# Patient Record
Sex: MALE | Race: WHITE | NOT HISPANIC OR LATINO | Employment: FULL TIME | URBAN - METROPOLITAN AREA
[De-identification: names, ages, dates, MRNs, and addresses within clinical notes are randomized per-mention and may not be internally consistent; named-entity substitution may affect disease eponyms.]

---

## 2018-04-24 ENCOUNTER — OFFICE VISIT (OUTPATIENT)
Dept: NEUROLOGY | Facility: CLINIC | Age: 33
End: 2018-04-24
Attending: PSYCHIATRY & NEUROLOGY
Payer: COMMERCIAL

## 2018-04-24 VITALS
SYSTOLIC BLOOD PRESSURE: 119 MMHG | WEIGHT: 253.5 LBS | BODY MASS INDEX: 32.53 KG/M2 | OXYGEN SATURATION: 97 % | RESPIRATION RATE: 24 BRPM | HEIGHT: 74 IN | DIASTOLIC BLOOD PRESSURE: 79 MMHG | TEMPERATURE: 98 F | HEART RATE: 73 BPM

## 2018-04-24 DIAGNOSIS — G37.9 DEMYELINATING DISEASE OF CENTRAL NERVOUS SYSTEM (H): Primary | ICD-10-CM

## 2018-04-24 PROCEDURE — G0463 HOSPITAL OUTPT CLINIC VISIT: HCPCS | Mod: ZF

## 2018-04-24 ASSESSMENT — PAIN SCALES - GENERAL: PAINLEVEL: SEVERE PAIN (6)

## 2018-04-24 NOTE — MR AVS SNAPSHOT
"              After Visit Summary   2018    Chu Paris    MRN: [id number]           Patient Information     Date Of Birth          1985        Visit Information        Provider Department      2018 10:30 AM Murphy Pride MD [facility] Multiple Sclerosis        Today's Diagnoses     Demyelinating disease of central nervous system (H)    -  1       Follow-ups after your visit        Follow-up notes from your care team     Return if symptoms worsen or fail to improve.      Who to contact     If you have questions or need follow up information about today's clinic visit or your schedule please contact [facility] MULTIPLE SCLEROSIS directly at [phone].  Normal or non-critical lab and imaging results will be communicated to you by [employer], letter or phone within 4 business days after the clinic has received the results. If you do not hear from us within 7 days, please contact the clinic through [employer] or phone. If you have a critical or abnormal lab result, we will notify you by phone as soon as possible.  Submit refill requests through [facility] or call your pharmacy and they will forward the refill request to us. Please allow 3 business days for your refill to be completed.          Additional Information About Your Visit        MyChart Information     [facility] lets you send messages to your doctor, view your test results, renew your prescriptions, schedule appointments and more. To sign up, go to [email or web address] . Click on \"Log in\" on the left side of the screen, which will take you to the Welcome page. Then click on \"Sign up Now\" on the right side of the page.     You will be asked to enter the access code listed below, as well as some personal information. Please follow the directions to create your username and password.     Your access code is: [email or web address]  Expires: 2018  6:31 AM     Your access code will  in 90 days. If you need help or a new code, please call your " "[facility] or [phone].        Care EveryWhere ID     This is your Care EveryWhere ID. This could be used by other organizations to access your [facility] medical records  [id number]        Your Vitals Were     Pulse Temperature Respirations Height Pulse Oximetry BMI (Body Mass Index)    73 98  F (36.7  C) (Oral) 24 1.88 m (6' 2\") 97% 32.55 kg/m2       Blood Pressure from Last 3 Encounters:   04/24/18 119/79   11/01/16 115/68   11/03/15 123/80    Weight from Last 3 Encounters:   04/24/18 115 kg (253 lb 8 oz)   11/01/16 116.4 kg (256 lb 11.2 oz)   09/23/14 110.2 kg (243 lb)              Today, you had the following     No orders found for display      Information about OPIOIDS     PRESCRIPTION OPIOIDS: WHAT YOU NEED TO KNOW   You have a prescription for an opioid (narcotic) pain medicine. Opioids can cause addiction. If you have a history of chemical dependency of any type, you are at a higher risk of becoming addicted to opioids. Only take this medicine after all other options have been tried. Take it for as short a time and as few doses as possible.     Do not:    Drive. If you drive while taking these medicines, you could be arrested for driving under the influence (DUI).    Operate heavy machinery    Do any other dangerous activities while taking these medicines.     Drink any alcohol while taking these medicines.      Take with any other medicines that contain acetaminophen. Read all labels carefully. Look for the word  acetaminophen  or  Tylenol.  Ask your pharmacist if you have questions or are unsure.    Store your pills in a secure place, locked if possible. We will not replace any lost or stolen medicine. If you don t finish your medicine, please throw away (dispose) as directed by your pharmacist. The [place] [employer]ution [employer] has more information about safe disposal: https://www.pca.state.[place]/living-green/managing-unwanted-medications    All opioids tend to cause constipation. Drink " plenty of water and eat foods that have a lot of fiber, such as fruits, vegetables, prune juice, apple juice and high-fiber cereal. Take a laxative (Miralax, milk of magnesia, Colace, Senna) if you don t move your bowels at least every other day.          Primary Care Provider Office Phone # Fax #    Stanislav Call [phone]       [facility] [address] [place] [phone]        Equal Access to Services     SURESH AVELAR : Hadii nat ku hadasho Soomaali, waaxda luqadaha, qaybta kaalmada adeegyada, waxay yvonne carrizales. So [facility] [phone].    ATENCIÓN: Si habla español, tiene a white disposición servicios gratuitos de asistencia lingüística. Llame al [phone].    We comply with applicable federal civil rights laws and [place] laws. We do not discriminate on the basis of race, color, national origin, age, disability, sex, sexual orientation, or gender identity.            Thank you!     Thank you for choosing [facility] MULTIPLE SCLEROSIS  for your care. Our goal is always to provide you with excellent care. Hearing back from our patients is one way we can continue to improve our services. Please take a few minutes to complete the written survey that you may receive in the mail after your visit with us. Thank you!             Your Updated Medication List - Protect others around you: Learn how to safely use, store and throw away your medicines at www.disposemymeds.org.          This list is accurate as of 4/24/18 11:59 PM.  Always use your most recent med list.                   Brand Name Dispense Instructions for use Diagnosis    DAILY MULTIVITAMIN PO      Take 1 tablet by mouth daily        TRAMADOL HCL PO      Take 50 mg by mouth every 6 hours as needed for moderate to severe pain        VITAMIN D (CHOLECALCIFEROL) PO      Take 5,000 Units by mouth daily

## 2018-04-24 NOTE — NURSING NOTE
"Chief Complaint   Patient presents with     RECHECK     [facility]- MULTIPLE SCLEROSIS, 1 YEAR F/U       Initial /79  Pulse 73  Temp 98  F (36.7  C) (Oral)  Resp 24  Ht 1.88 m (6' 2\")  Wt 115 kg (253 lb 8 oz)  SpO2 97%  BMI 32.55 kg/m2 Estimated body mass index is 32.55 kg/(m^2) as calculated from the following:    Height as of this encounter: 1.88 m (6' 2\").    Weight as of this encounter: 115 kg (253 lb 8 oz).  Medication Reconciliation: complete     Marvin Gustafson, CMA      "

## 2018-04-24 NOTE — LETTER
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  He had an attack of partial transverse myelitis in 2013, from which he completely recovered.  Spinal fluid analysis showed no evidence of intrathecal IgG synthesis.  Brain MRI shows a few tiny scattered non-specific lesions.    Mr. Paris does not meet diagnostic criteria for multiple sclerosis (MS).  We have been following him with clinical and MRI surveillance to see if he develops that disorder, which to date he has not.        Sincerely,             Murphy Pride MD

## 2018-04-24 NOTE — LETTER
"  4/24/2018      RE: Chu Paris  [address] [address]       Service Date: 04/24/2018      REASON FOR VISIT:  Chu Paris is a 32-year-old man who I follow for clinically isolated syndrome/possible multiple sclerosis.  He returns for routine followup today.  I last saw him in 11/2016.      HISTORY OF PRESENT ILLNESS:  Jonah has been doing fine and has no residual neurologic symptoms from his attack of partial transverse myelitis in 2013.  He is lifting weights regularly and states he is in the \"best shape of my life.\"  He remains on no immunotherapy.  He questions whether he has multiple sclerosis, and we discussed that in detail today.  It is certainly quite possible that he does not, and the transverse myelitis was a one-time event.  He asks for a letter clarifying this and I wrote that for him today.  He had previously been felt to meet diagnostic criteria for MS based on MRI findings, but that really hinged on the presence of a very questionable enhancing lesion on a scan from 2013.  We reviewed that again today.  There is one T2 hyperintense lesion on that scan that may be faintly enhancing on 1 post-contrast view, but is not visible on the other.  Overall, I really do not think this represents sufficient evidence to meet diagnostic criteria for MS, especially given his lack of lesion accumulation and normal spinal fluid.  He has not had any new non-neurologic medical problems since I saw him last.      PHYSICAL EXAMINATION:   VITAL SIGNS:  Blood pressure 119/79.  Pulse 73.  Weight 253 pounds.     GENERAL:   He is alert and oriented.  Affect is bright and language functions are normal.     NEUROLOGIC:   Cranial nerves are unremarkable.  He is a large and very muscular man.  Muscle bulk, tone and strength are normal in all 4 limbs.  Coordination testing is normal to finger tapping, toe tapping and finger-nose-finger.  Light touch is intact in the hands and feet.  Deep tendon reflexes are " normal and symmetric and his gait is normal.      IMPRESSION:  Clinically isolated syndrome, clinically stable on no immunotherapy.        I spent 25 minutes with Jonah today, greater than 50% of which was spent in counseling and coordination of care.  We had planned to repeat a brain MRI this fall.  I had instructed him in late  that we would repeat an MRI in about 2 years, and if he was feeling fine he could wait until then to come back and see me.  He had misinterpreted this as that he should come back now, but the MRI at this time was optional.  He is basically 5 years out from his clinically isolated syndrome.  I told him that I think monitoring for new lesions is imperative for at least 5 years after an initial demyelinating event, and continuing it for up to 10 years is reasonable as well.  He would like to hold off on scheduling a repeat MRI due to significant out-of-pocket costs.  I told him that doing it any time in the next 1-2 years is fine, and he will call when it is convenient to get that set up.        PLAN:      1.  He will call sometime in the next 1-2 years to schedule a repeat MRI scan.     2.  Followup p.r.n.      MD MURPHY Dobson MD             D: 2018   T: 2018   MT: FILI      Name:     MARTHA GUSMAN   MRN:      [id number]        Account:      [id number]   :      1985           Service Date: 2018      Document: [id number]        Murphy Pride MD

## 2018-04-24 NOTE — LETTER
"4/24/2018      RE: Chu Paris  [address] [facility] [address]       Service Date: 04/24/2018      REASON FOR VISIT:  Chu Paris is a 32-year-old man who I follow for clinically isolated syndrome/possible multiple sclerosis.  He returns for routine followup today.  I last saw him in 11/2016.      HISTORY OF PRESENT ILLNESS:  Jonah has been doing fine and has no residual neurologic symptoms from his attack of partial transverse myelitis in 2013.  He is lifting weights regularly and states he is in the \"best shape of my life.\"  He remains on no immunotherapy.  He questions whether he has multiple sclerosis, and we discussed that in detail today.  It is certainly quite possible that he does not, and the transverse myelitis was a one-time event.  He asks for a letter clarifying this and I wrote that for him today.  He had previously been felt to meet diagnostic criteria for MS based on MRI findings, but that really hinged on the presence of a very questionable enhancing lesion on a scan from 2013.  We reviewed that again today.  There is one T2 hyperintense lesion on that scan that may be faintly enhancing on 1 post-contrast view, but is not visible on the other.  Overall, I really do not think this represents sufficient evidence to meet diagnostic criteria for MS, especially given his lack of lesion accumulation and normal spinal fluid.  He has not had any new non-neurologic medical problems since I saw him last.      PHYSICAL EXAMINATION:   VITAL SIGNS:  Blood pressure 119/79.  Pulse 73.  Weight 253 pounds.     GENERAL:   He is alert and oriented.  Affect is bright and language functions are normal.     NEUROLOGIC:   Cranial nerves are unremarkable.  He is a large and very muscular man.  Muscle bulk, tone and strength are normal in all 4 limbs.  Coordination testing is normal to finger tapping, toe tapping and finger-nose-finger.  Light touch is intact in the hands and feet.  Deep tendon reflexes are " normal and symmetric and his gait is normal.      IMPRESSION:  Clinically isolated syndrome, clinically stable on no immunotherapy.        I spent 25 minutes with Jonah today, greater than 50% of which was spent in counseling and coordination of care.  We had planned to repeat a brain MRI this fall.  I had instructed him in late  that we would repeat an MRI in about 2 years, and if he was feeling fine he could wait until then to come back and see me.  He had misinterpreted this as that he should come back now, but the MRI at this time was optional.  He is basically 5 years out from his clinically isolated syndrome.  I told him that I think monitoring for new lesions is imperative for at least 5 years after an initial demyelinating event, and continuing it for up to 10 years is reasonable as well.  He would like to hold off on scheduling a repeat MRI due to significant out-of-pocket costs.  I told him that doing it any time in the next 1-2 years is fine, and he will call when it is convenient to get that set up.        PLAN:      1.  He will call sometime in the next 1-2 years to schedule a repeat MRI scan.     2.  Followup p.r.n.         D: 2018   T: 2018   MT: FILI      Name:     MARTHA GUSMAN   MRN:      [id number]        Account:      [id number]   :      1985           Service Date: 2018      Document: [id number]        Murphy Pride MD

## 2018-04-26 NOTE — PROGRESS NOTES
"Service Date: 04/24/2018      REASON FOR VISIT:  Chu Paris is a 32-year-old man who I follow for clinically isolated syndrome/possible multiple sclerosis.  He returns for routine followup today.  I last saw him in 11/2016.      HISTORY OF PRESENT ILLNESS:  Jonah has been doing fine and has no residual neurologic symptoms from his attack of partial transverse myelitis in 2013.  He is lifting weights regularly and states he is in the \"best shape of my life.\"  He remains on no immunotherapy.  He questions whether he has multiple sclerosis, and we discussed that in detail today.  It is certainly quite possible that he does not, and the transverse myelitis was a one-time event.  He asks for a letter clarifying this and I wrote that for him today.  He had previously been felt to meet diagnostic criteria for MS based on MRI findings, but that really hinged on the presence of a very questionable enhancing lesion on a scan from 2013.  We reviewed that again today.  There is one T2 hyperintense lesion on that scan that may be faintly enhancing on 1 post-contrast view, but is not visible on the other.  Overall, I really do not think this represents sufficient evidence to meet diagnostic criteria for MS, especially given his lack of lesion accumulation and normal spinal fluid.  He has not had any new non-neurologic medical problems since I saw him last.      PHYSICAL EXAMINATION:   VITAL SIGNS:  Blood pressure 119/79.  Pulse 73.  Weight 253 pounds.     GENERAL:   He is alert and oriented.  Affect is bright and language functions are normal.     NEUROLOGIC:   Cranial nerves are unremarkable.  He is a large and very muscular man.  Muscle bulk, tone and strength are normal in all 4 limbs.  Coordination testing is normal to finger tapping, toe tapping and finger-nose-finger.  Light touch is intact in the hands and feet.  Deep tendon reflexes are normal and symmetric and his gait is normal.      IMPRESSION:  Clinically isolated " syndrome, clinically stable on no immunotherapy.        I spent 25 minutes with Jonah today, greater than 50% of which was spent in counseling and coordination of care.  We had planned to repeat a brain MRI this fall.  I had instructed him in late  that we would repeat an MRI in about 2 years, and if he was feeling fine he could wait until then to come back and see me.  He had misinterpreted this as that he should come back now, but the MRI at this time was optional.  He is basically 5 years out from his clinically isolated syndrome.  I told him that I think monitoring for new lesions is imperative for at least 5 years after an initial demyelinating event, and continuing it for up to 10 years is reasonable as well.  He would like to hold off on scheduling a repeat MRI due to significant out-of-pocket costs.  I told him that doing it any time in the next 1-2 years is fine, and he will call when it is convenient to get that set up.        PLAN:      1.  He will call sometime in the next 1-2 years to schedule a repeat MRI scan.     2.  Followup p.r.n.      MD AUGIE Dobson MD             D: 2018   T: 2018   MT: FILI      Name:     MARTHA GUSMAN   MRN:      [id number]        Account:      [id number]   :      1985           Service Date: 2018      Document: [id number]

## 2018-06-26 ENCOUNTER — TELEPHONE (OUTPATIENT)
Dept: NEUROLOGY | Facility: CLINIC | Age: 33
End: 2018-06-26

## 2018-06-26 NOTE — TELEPHONE ENCOUNTER
I left VMM at number provided and left VMM with our medical records department information.    Divina Ramirez, MS RN Care Coordinator

## 2018-06-26 NOTE — TELEPHONE ENCOUNTER
[facility] Call Center    Phone Message    May a detailed message be left on voicemail: yes    Reason for Call: Other: Barbara called in regards to forms she sent for Medical Records Please call back to discuss status Phone: [phone], OK to leave a message.      Action Taken: Message routed to:  [facility] Neuro

## 2018-07-10 NOTE — TELEPHONE ENCOUNTER
[facility] Call Center    Phone Message    May a detailed message be left on voicemail: yes    Reason for Call: Form or Letter   Type or form/letter needing completion: Release of Records  Provider: Dr. Pride  Date form needed: ASAP  Once completed: Mail form to Name: Chu Paris, at Address: [address] [place] [address], [id number]     Patient has been trying to get a hold of medical records for weeks now. Has called and left several messages. Insurance company has also called and left messages. Please have someone from medical records return his call or send his records to him. Thank you.      Action Taken: Message routed to:  [facility] ([facility] Neurology

## 2022-05-25 ENCOUNTER — TRANSFERRED RECORDS (OUTPATIENT)
Dept: HEALTH INFORMATION MANAGEMENT | Facility: CLINIC | Age: 37
End: 2022-05-25
Payer: COMMERCIAL

## 2022-05-26 ENCOUNTER — TRANSCRIBE ORDERS (OUTPATIENT)
Dept: OTHER | Age: 37
End: 2022-05-26
Payer: COMMERCIAL

## 2022-05-26 DIAGNOSIS — G35 MULTIPLE SCLEROSIS (H): ICD-10-CM

## 2022-05-26 DIAGNOSIS — R90.89 ABNORMAL FINDING ON MRI OF BRAIN: Primary | ICD-10-CM

## 2022-12-06 ENCOUNTER — OFFICE VISIT (OUTPATIENT)
Dept: NEUROLOGY | Facility: CLINIC | Age: 37
End: 2022-12-06
Attending: FAMILY MEDICINE
Payer: COMMERCIAL

## 2022-12-06 VITALS
DIASTOLIC BLOOD PRESSURE: 83 MMHG | BODY MASS INDEX: 33.45 KG/M2 | HEART RATE: 65 BPM | SYSTOLIC BLOOD PRESSURE: 134 MMHG | WEIGHT: 260.5 LBS | OXYGEN SATURATION: 99 %

## 2022-12-06 DIAGNOSIS — R42 DIZZINESS: ICD-10-CM

## 2022-12-06 DIAGNOSIS — R90.89 ABNORMAL FINDING ON MRI OF BRAIN: ICD-10-CM

## 2022-12-06 DIAGNOSIS — G37.3 TRANSVERSE MYELITIS (H): Primary | ICD-10-CM

## 2022-12-06 PROCEDURE — 99204 OFFICE O/P NEW MOD 45 MIN: CPT | Mod: GC | Performed by: PSYCHIATRY & NEUROLOGY

## 2022-12-06 PROCEDURE — G0463 HOSPITAL OUTPT CLINIC VISIT: HCPCS | Performed by: PSYCHIATRY & NEUROLOGY

## 2022-12-06 ASSESSMENT — PAIN SCALES - GENERAL: PAINLEVEL: NO PAIN (0)

## 2022-12-06 NOTE — PROGRESS NOTES
Neurology Clinic Visit    Reason: Sensory Myelitis       2022   Source of information: Patient and chart review    History of Present Symptom:  Chu Paris is a 37 year old male with transverse myelitis who presents for follow up.       He developed partial myelitis in  with numbness spreading from the feet up through his neck. There was a question of multiple sclerosis because of some T2 changes on MRI but these on closer review were not typical MS lesions. He was monitored by imaging through 2016 with no accumulation of new lesions. He presents today for follow up.    He reports lightheaded episodes lasting seconds. No room spinning. These would come on while looking up briefly. Occasionally would happen when watching heights on TV. He does have some anxiety with heights. These have been on and off for the past 1-2 years. At its worst it was happening daily, recently it has been over one month.     Wife had severe vertigo around the same time. He noted some black mold around the window trim and has been removing this. He states he has been less active and has been drinking more alcohol.     Chiropractor has helped.     The patient's medical, surgical, social, and family history were personally reviewed with the patient.  No past medical history on file.   Past Surgical History:   Procedure Laterality Date     ORTHOPEDIC SURGERY      acl     REPAIR NERVE MICROSCOPIC UPPER EXTREMITY  4/3/2012    Procedure: REPAIR NERVE MICROSCOPIC UPPER EXTREMITY;  Left Index Radial Digital Nerve Repair  Excision of Neuroma;  Surgeon: Shon Hickman MD;  Location:  OR     Social History     Tobacco Use     Smoking status: Former     Packs/day: 0.10     Years: 10.00     Pack years: 1.00     Types: Cigars, Cigarettes     Quit date: 2015     Years since quittin.1     Smokeless tobacco: Former     Types: Chew   Substance Use Topics     Alcohol use: Yes     Alcohol/week: 0.0 standard drinks      Comment: socially     Drug use: No     Family History   Problem Relation Age of Onset     Cancer Paternal Grandmother      Neurologic Disorder Father         brain aneurysm     Current Outpatient Medications   Medication     Multiple Vitamin (DAILY MULTIVITAMIN PO)     TRAMADOL HCL PO     VITAMIN D, CHOLECALCIFEROL, PO     No current facility-administered medications for this visit.     No Known Allergies      Review of Systems:  14-point review of systems was completed. The pertinent positives and negatives are in the HPI.    Physical Examination   Vitals: There were no vitals taken for this visit.  General: Patient appears comfortable in no acute distress.   HEENT: NC/AT, no icterus, moist mucous membranes  Chest: non-labored on RA  Extremities: Warm, no edema  Skin: No rash or lesion   Psych: Affect appropriate for situation   Neuro:  Mental status: Awake, alert, attentive. Language is fluent with intact comprehension of commands.  Cranial nerves: PERRL with no relative afferent pupillary defect, conjugate gaze, EOMI, visual fields intact, face symmetric, shoulder shrug strong, tongue protrusion/uvula midline, no dysarthria.   Motor: Normal muscle bulk and tone. No abnormal movements. 5/5 strength in 4/4 extremities.     R L  Deltoid  5 5  Biceps  5 5  Triceps 5 5  Wrist ext 5 5  Finger ext 5 5  Finger abd 5 5    Hip flexion 5 5  Knee flexion 5 5  Knee ext 5 5  Dorsiflexion 5 5    Reflexes: 2+ reflexes symmetric in biceps, brachioradialis, patellae, and achilles. No clonus, toes down-going.  Sensory: Intact to light touch and vibration. Romberg is negative.   Coordination: FNF without ataxia or dysmetria.    Gait: Normal width, stride length, turn, with symmetric arm swing. Tandem walk intact. Able to balance on one leg bilaterally.     Laboratory:  No new lab data.     Imaging:    MRI brain 11/2016  Non specific small T2 hyperintensity subcortical with no enhancement.     MRI c-spine 3/2013   contrast enhancing  lesions in cervical cord Cervicomedullary junction, C3 and at C5-C6 and a small possible lesions around T11.     Assessment/Plan:  Chu Paris is a 37 year old male who presents for follow up for transverse myelitis.  He has not developed any new symptoms over time and his previous symptoms have completely resolved.  We discussed that this could represent clinically isolated syndrome versus benign MS.  Another possibility would be that he would eventually develop symptomatic multiple sclerosis over time.  The more time that passes since his initial event and 2013 the less likely to have become.     We discussed his lightheaded and dizzy sensation.  This is typically associated with heights or with looking up.  The symptoms have been improving and are happening less than once a month currently.  This could represent an anxiety response with heights.  Other possibilities could be narrowing of the posterior circulation. Since things are improving we recommend to watch for now.     -MRI brain/cervical cord  -follow up if symptoms worsen or continue       Patient seen and discussed with Dr. Pride.   I have reviewed the plan with the patient, who is in agreement.      Sary Calvo, DO  Multiple Sclerosis Fellow      I saw and examined this patient, and have read and edited the resident's note.  I agree with the findings, assessment, and plan.  I spent 47 minutes on his care on the date of service, including chart review and face to face time.  We discussed his dizzy spells - given their intermittent nature and short duration I am not concerned about a new central nervous system lesion as a cause for these.  Discussed CIS and the possibility of it portending a diagnosis of MS.  Plan as above.    Murphy Pride MD

## 2022-12-23 ENCOUNTER — HOSPITAL ENCOUNTER (OUTPATIENT)
Dept: MRI IMAGING | Facility: CLINIC | Age: 37
Discharge: HOME | End: 2022-12-23
Attending: PSYCHIATRY & NEUROLOGY
Payer: COMMERCIAL

## 2022-12-23 DIAGNOSIS — R90.89 ABNORMAL FINDING ON MRI OF BRAIN: ICD-10-CM

## 2022-12-23 PROCEDURE — 255N000002 HC RX 255 OP 636: Performed by: PSYCHIATRY & NEUROLOGY

## 2022-12-23 PROCEDURE — A9585 GADOBUTROL INJECTION: HCPCS | Performed by: PSYCHIATRY & NEUROLOGY

## 2022-12-23 PROCEDURE — 72156 MRI NECK SPINE W/O & W/DYE: CPT | Mod: 26 | Performed by: RADIOLOGY

## 2022-12-23 PROCEDURE — 72156 MRI NECK SPINE W/O & W/DYE: CPT

## 2022-12-23 PROCEDURE — 70553 MRI BRAIN STEM W/O & W/DYE: CPT

## 2022-12-23 PROCEDURE — 70553 MRI BRAIN STEM W/O & W/DYE: CPT | Mod: 26 | Performed by: RADIOLOGY

## 2022-12-23 RX ORDER — GADOBUTROL 604.72 MG/ML
0.1 INJECTION INTRAVENOUS ONCE
Status: COMPLETED | OUTPATIENT
Start: 2022-12-23 | End: 2022-12-23

## 2022-12-23 RX ADMIN — GADOBUTROL 10 ML: 604.72 INJECTION INTRAVENOUS at 15:03
